# Patient Record
Sex: FEMALE | Race: WHITE
[De-identification: names, ages, dates, MRNs, and addresses within clinical notes are randomized per-mention and may not be internally consistent; named-entity substitution may affect disease eponyms.]

---

## 2018-03-16 ENCOUNTER — HOSPITAL ENCOUNTER (OUTPATIENT)
Dept: HOSPITAL 74 - FASU | Age: 48
Discharge: HOME | End: 2018-03-16
Attending: ORTHOPAEDIC SURGERY
Payer: COMMERCIAL

## 2018-03-16 VITALS — BODY MASS INDEX: 35.9 KG/M2

## 2018-03-16 VITALS — TEMPERATURE: 98 F

## 2018-03-16 VITALS — SYSTOLIC BLOOD PRESSURE: 115 MMHG | HEART RATE: 88 BPM | DIASTOLIC BLOOD PRESSURE: 73 MMHG

## 2018-03-16 DIAGNOSIS — G56.01: Primary | ICD-10-CM

## 2018-03-16 PROCEDURE — 01N50ZZ RELEASE MEDIAN NERVE, OPEN APPROACH: ICD-10-PCS | Performed by: ORTHOPAEDIC SURGERY

## 2018-03-18 NOTE — OP
DATE OF SURGERY:   03/16/2018, done at [facility]

 

PREOPERATIVE DIAGNOSIS:  Right Carpal Tunnel Syndrome 

 

POSTOPERATIVE DIAGNOSIS:  Right Carpal Tunnel Syndrome 

 

PROCEDURE:  Right Carpal Tunnel Release (63278) 

 

SURGEON: Dr. Abe Del Rosario 

 

ASSISTANT:  Max Matthews PA-C

 

FINDINGS: Transverse carpal ligament _____ from median nerve.

 

PROCEDURE:  Under sterile conditions, the right upper extremity was prepped and

draped in a sterile fashion.  Incision was made along the longitudinal portion of the

carpal tunnel.  A longitudinal incision was made along the proximal portion of the

palm, following the palm crease.  This was taken down to the transcarpal ligament,

which was released initially with scalpel and then extended proximally and distally

using blunt tenotomy scissors.  The median nerve was identified and completely

released from impingement by the transcarpal ligament. 

 

The wound was then irrigated with copious amounts of irrigation.  Skin was closed

with 5-0 nylon in single interrupted sutures. 

 

 

 

BECKY [id number]

DD: 03/18/2018 15:55

DT: 03/18/2018 20:05

Job #:  [id number]